# Patient Record
Sex: MALE | Race: WHITE | Employment: OTHER | URBAN - METROPOLITAN AREA
[De-identification: names, ages, dates, MRNs, and addresses within clinical notes are randomized per-mention and may not be internally consistent; named-entity substitution may affect disease eponyms.]

---

## 2019-10-26 ENCOUNTER — HOSPITAL ENCOUNTER (EMERGENCY)
Age: 69
Discharge: HOME | End: 2019-10-26
Attending: EMERGENCY MEDICINE
Payer: MEDICARE

## 2019-10-26 VITALS
BODY MASS INDEX: 23.62 KG/M2 | WEIGHT: 200 LBS | RESPIRATION RATE: 18 BRPM | TEMPERATURE: 98.1 F | HEIGHT: 77 IN | OXYGEN SATURATION: 94 % | DIASTOLIC BLOOD PRESSURE: 97 MMHG | SYSTOLIC BLOOD PRESSURE: 169 MMHG | HEART RATE: 87 BPM
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
## 2019-10-26 PROCEDURE — 90471 IMMUNIZATION ADMIN: CPT | Performed by: EMERGENCY MEDICINE

## 2019-10-26 PROCEDURE — 90715 TDAP VACCINE 7 YRS/> IM: CPT | Performed by: EMERGENCY MEDICINE

## 2019-10-26 PROCEDURE — 74011250636 HC RX REV CODE- 250/636: Performed by: EMERGENCY MEDICINE

## 2019-10-26 PROCEDURE — 99283 EMERGENCY DEPT VISIT LOW MDM: CPT | Performed by: EMERGENCY MEDICINE

## 2019-10-26 RX ADMIN — TETANUS TOXOID, REDUCED DIPHTHERIA TOXOID AND ACELLULAR PERTUSSIS VACCINE, ADSORBED 0.5 ML: 5; 2.5; 8; 8; 2.5 SUSPENSION INTRAMUSCULAR at 09:57

## 2019-10-26 NOTE — ED NOTES
I have reviewed discharge instructions with the patient. The patient verbalized understanding. Patient left ED via Discharge Method: ambulatory to Home. Opportunity for questions and clarification provided. Patient given 0 scripts. To continue your aftercare when you leave the hospital, you may receive an automated call from our care team to check in on how you are doing. This is a free service and part of our promise to provide the best care and service to meet your aftercare needs.  If you have questions, or wish to unsubscribe from this service please call [phone]. Thank you for Choosing our [facility] Emergency Department.

## 2019-10-26 NOTE — ED PROVIDER NOTES
She presents for evaluation of her presumed snakebite about 2 hours ago. Patient states she was walking out of his garage where he has seen black snakes on multiple occasions, and in the dark felt a sudden pain to the right Achilles area. He is otherwise asymptomatic. The history is provided by the patient. Snake Bite    This is a new problem. The current episode started 1 to 2 hours ago. The problem occurs constantly. The problem has not changed since onset. The pain is present in the right ankle. The quality of the pain is described as aching. The pain is mild. Pertinent negatives include no numbness, full range of motion, no stiffness and no tingling. The symptoms are aggravated by contact. He has tried nothing for the symptoms. The treatment provided no relief. There has been a history of trauma.         Past Medical History:   Diagnosis Date    Chronic cough 3/29/2013    Coronary atherosclerosis of unspecified type of bypass graft 3/29/2013    Other and unspecified hyperlipidemia 3/29/2013    Restless legs syndrome (RLS) 3/29/2013    Shortness of breath 3/29/2013    Snoring 3/29/2013    Wheezing 3/29/2013       Past Surgical History:   Procedure Laterality Date    CARDIAC SURG PROCEDURE UNLIST  10/2006    cath w/2 stents    HX SPLENECTOMY  1958    due to a fall         Family History:   Problem Relation Age of Onset    Other Father         sleep apnea    Cancer Brother         prostate    High Cholesterol Sister        Social History     Socioeconomic History    Marital status:      Spouse name: Not on file    Number of children: 2    Years of education: Not on file    Highest education level: Not on file   Occupational History    Occupation: Warehouse     Comment: dust, charcoal dust exp, aluminum dust   Social Needs    Financial resource strain: Not on file    Food insecurity:     Worry: Not on file     Inability: Not on file    Transportation needs:     Medical: Not on file Non-medical: Not on file   Tobacco Use    Smoking status: Never Smoker   Substance and Sexual Activity    Alcohol use: Not on file    Drug use: Not on file    Sexual activity: Not on file   Lifestyle    Physical activity:     Days per week: Not on file     Minutes per session: Not on file    Stress: Not on file   Relationships    Social connections:     Talks on phone: Not on file     Gets together: Not on file     Attends Caoda[employer] service: Not on file     Active member of club or organization: Not on file     Attends meetings of clubs or organizations: Not on file     Relationship status: Not on file    Intimate partner violence:     Fear of current or ex partner: Not on file     Emotionally abused: Not on file     Physically abused: Not on file     Forced sexual activity: Not on file   Other Topics Concern    Not on file   Social History Narrative    The patient is a lifelong nonsmoker with 18 year secondhand smoke exposure from his father cigarettes. 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  Patient states he has been exposed to aluminum dust in the past also. , two daughters who are healthy. Hehad his lived in [place]. Dog as a pet, no history of pet bird. ALLERGIES: Patient has no known allergies. Review of Systems   Musculoskeletal: Negative for stiffness. Neurological: Negative for tingling and numbness. All other systems reviewed and are negative. Vitals:    10/26/19 0927   BP: (!) 169/97   Pulse: 87   Resp: 18   Temp: 98.1 °F (36.7 °C)   SpO2: 94%   Weight: 90.7 kg (200 lb)   Height: 6' 5\" (1.956 m)            Physical Exam   Constitutional: He is oriented to person, place, and time. He appears well-developed and well-nourished. No distress. HENT:   Head: Normocephalic and atraumatic. Eyes: Pupils are equal, round, and reactive to light.  Conjunctivae and EOM are normal.   Neurological: He is alert and oriented to person, place, and time. Skin: Skin is warm and dry. Capillary refill takes less than 2 seconds. No rash noted. He is not diaphoretic. No erythema. Examination of the right ankle shows a row of lesions consistent with a nonvenomous snake bite. There are no puncture wounds to suggest pit viper. And there is no erythema or swelling or other abnormalities to suggest an envenomation. Psychiatric: He has a normal mood and affect. His behavior is normal.   Nursing note and vitals reviewed.        MDM       Procedures

## 2019-10-26 NOTE — ED TRIAGE NOTES
Pt presents to the ED with snake bite to R ankle,  Reports he believes it was a black snake.   Small bite noted,

## 2019-10-26 NOTE — DISCHARGE INSTRUCTIONS
Patient Education        Snake Bites: Care Instructions  Your Care Instructions  You have been bitten by a snake. Most snakes are not poisonous. If the snake that bit you was poisonous, you may have been treated with antivenom. Sometimes, a snake bites but does not inject venom. Taking good care of the wound at home will help it heal quickly and reduce your chance of infection. Follow-up care is a key part of your treatment and safety. Be sure to make and go to all appointments, and call your doctor if you are having problems. It's also a good idea to know your test results and keep a list of the medicines you take. How can you care for yourself at home? · If your doctor prescribed medicine, take it exactly as prescribed. Call your doctor if you think you are having a problem with your medicine. · If your doctor told you how to care for your wound, follow your doctor's instructions. If you did not get instructions, follow this general advice:  ? Wash the area with clean water 2 times a day. Don't use hydrogen peroxide or alcohol, which can slow healing. ? You may cover the bite with a thin layer of petroleum jelly, such as Vaseline, and a nonstick bandage. ? Apply more petroleum jelly and replace the bandage as needed. · Take an over-the-counter pain medicine, such as acetaminophen (Tylenol). · Do not take two or more pain medicines at the same time unless the doctor told you to. Many pain medicines have acetaminophen, which is Tylenol. Too much acetaminophen (Tylenol) can be harmful. · Some pain is normal with a snake bite, but do not ignore pain that is getting worse instead of better. You could have an infection. When should you call for help?   Call your doctor now or seek immediate medical care if:    · You have pain at or near the bite that is getting worse.     · You have more pain when you move the area of your body where the bite is located.     · You have signs of infection, such as:  ? Increased pain, swelling, warmth, or redness. ? Red streaks leading from the bite. ? Pus draining from the bite. ? A fever.     · The bite starts to bleed, and blood soaks through the bandage. Oozing small amounts of blood is normal.     · You have a fever and chills.     · You get a rash or severe itching.     · You get severe muscle or joint aches.     · You have blood in your urine.     · You have numbness and tingling.    Watch closely for changes in your health, and be sure to contact your doctor if:    · You do not get better as expected. Where can you learn more? Go to http://danny-nicolle.info/. Enter [id number] 03 534 in the search box to learn more about \"Snake Bites: Care Instructions. \"  Current as of: June 26, 2019  Content Version: 12.2  © 6710-3875 [employer]. Care instructions adapted under license by [employer] (which disclaims liability or warranty for this information). If you have questions about a medical condition or this instruction, always ask your healthcare professional. [address] any warranty or liability for your use of this information.